# Patient Record
Sex: FEMALE | URBAN - METROPOLITAN AREA
[De-identification: names, ages, dates, MRNs, and addresses within clinical notes are randomized per-mention and may not be internally consistent; named-entity substitution may affect disease eponyms.]

---

## 2024-10-18 ENCOUNTER — APPOINTMENT (RX ONLY)
Dept: URBAN - METROPOLITAN AREA CLINIC 124 | Facility: CLINIC | Age: 54
Setting detail: DERMATOLOGY
End: 2024-10-18

## 2024-10-18 PROBLEM — C44.91 BASAL CELL CARCINOMA OF SKIN, UNSPECIFIED: Status: ACTIVE | Noted: 2024-10-18

## 2024-10-18 PROCEDURE — ? ELECTRON BEAM THERAPY FOLLOW-UP

## 2024-10-18 PROCEDURE — 99024 POSTOP FOLLOW-UP VISIT: CPT

## 2024-10-18 NOTE — PROCEDURE: ELECTRON BEAM THERAPY FOLLOW-UP
Site: right nasal ala
Date Of Completion: 09/23/2024
Add Another Treatment Site: No
Detail Level: Simple
Treatment #: 20
Daily Dosage In Cgy: 250
Total Site Dosage In Cgy: 5000
Diagnosis: BCC

## 2024-11-11 ENCOUNTER — APPOINTMENT (RX ONLY)
Dept: URBAN - METROPOLITAN AREA CLINIC 124 | Facility: CLINIC | Age: 54
Setting detail: DERMATOLOGY
End: 2024-11-11

## 2024-11-18 ENCOUNTER — APPOINTMENT (RX ONLY)
Dept: URBAN - METROPOLITAN AREA CLINIC 124 | Facility: CLINIC | Age: 54
Setting detail: DERMATOLOGY
End: 2024-11-18

## 2024-11-18 PROBLEM — C44.91 BASAL CELL CARCINOMA OF SKIN, UNSPECIFIED: Status: ACTIVE | Noted: 2024-11-18

## 2024-11-18 PROCEDURE — ? ELECTRON BEAM THERAPY FOLLOW-UP

## 2024-11-18 PROCEDURE — 99024 POSTOP FOLLOW-UP VISIT: CPT

## 2024-11-18 NOTE — PROCEDURE: ELECTRON BEAM THERAPY FOLLOW-UP
Date Of Completion: 09/23/2024
Add Another Treatment Site: No
Site: right nasal Ala
Detail Level: Simple
Treatment #: 20
Daily Dosage In Cgy: 250
Total Site Dosage In Cgy: 5000
Diagnosis: BCC